# Patient Record
Sex: MALE | Race: WHITE | URBAN - METROPOLITAN AREA
[De-identification: names, ages, dates, MRNs, and addresses within clinical notes are randomized per-mention and may not be internally consistent; named-entity substitution may affect disease eponyms.]

---

## 2017-09-08 NOTE — PATIENT DISCUSSION
- Follow up in 1 week or sooner if she develops any ocular pain, diplopia, changes in vision, or motility issues

## 2017-09-08 NOTE — PATIENT DISCUSSION
New Prescription: doxycycline hyclate (doxycycline hyclate): capsule: 100 mg 1 capsule twice a day with meals by mouth 09-

## 2017-09-08 NOTE — PATIENT DISCUSSION
New Prescription: Keflex (cephalexin): capsule: 500 mg 1 capsule three times a day as directed by mouth 09-

## 2017-09-14 NOTE — PATIENT DISCUSSION
Continue: doxycycline hyclate (doxycycline hyclate): capsule: 100 mg 1 capsule twice a day with meals by mouth 09-

## 2017-09-14 NOTE — PATIENT DISCUSSION
New Prescription: neomycin-polymyxin-dexameth (neomycin-polymyxin-dexameth): ointment: 3.5-10,000-0.1 mg-unit/g-% 1 a small amount three times a day as directed into left eye 09-

## 2017-09-14 NOTE — PATIENT DISCUSSION
- Follow up in 1 month.  If no improvement in size of chalazion consider incision and drainage at that time

## 2017-10-16 NOTE — PATIENT DISCUSSION
Stopped Today: neomycin-polymyxin-dexameth (neomycin-polymyxin-dexameth): ointment: 3.5-10,000-0.1 mg-unit/g-% 1 a small amount three times a day as directed into left eye 09-

## 2017-10-16 NOTE — PATIENT DISCUSSION
- Continue to treat mild dry eye with artificial tears.  Patient will call if she changes her mind and would like a referral to Dr. Elise Reddy

## 2018-04-19 NOTE — PATIENT DISCUSSION
- Continue to treat mild dry eye with artificial tears.  Patient will call if she changes her mind and would like a referral to Dr. Lizeth Zayas

## 2018-05-04 NOTE — PATIENT DISCUSSION
- Refractive goal: Had a lengthy discussion with the patient about this. She does not mind wearing glasses and does not want a lens that isn't covered by insurance. She is unsure if she would rather have distance or near vision uncorrected, but is leaning toward aiming for distance. Will call her again a few days before surgery to confirm.

## 2018-05-04 NOTE — PATIENT DISCUSSION
- Discussed the risks, benefits, and alternatives of cataract surgery including but not limited to infection, bleeding, posterior capsular tear, need for additional surgery including secondary IOL or vitrectomy, the continued need to wear glasses at both distance and near, and permanent loss of vision. There is no guarantee that cataract surgery will improve the patient's vision or reduce glare/halos. The patient appears to understand and wishes to proceed with surgery, LEFT EYE FIRST (left eye dominant).

## 2018-06-21 NOTE — PATIENT DISCUSSION
Continue: prednisolone acetate (prednisolone acetate): drops,suspension: 1% 1 drop four times a day as directed into left eye 06-

## 2018-06-28 NOTE — PATIENT DISCUSSION
Continue: prednisolone acetate (prednisolone acetate): drops,suspension: 1% 1 drop three times a day as directed into left eye 06-

## 2018-07-19 NOTE — PATIENT DISCUSSION
- Advised patient to use artificial tears BID-TID for at least the next few days.  She may also use a drop of Pred forte daily for the next 3-4 days

## 2018-08-02 NOTE — PATIENT DISCUSSION
- Activity restrictions:  [place] shield use at night, no rubbing eye, no swimming, no excessive heavy lifting or bending over

## 2018-08-02 NOTE — PATIENT DISCUSSION
Continue: prednisolone acetate (prednisolone acetate): drops,suspension: 1% 1 drop four times a day as directed into right eye 07-

## 2018-08-10 NOTE — PATIENT DISCUSSION
Stopped Today: ofloxacin (ofloxacin): drops: 0.3% 1 drop four times a day as directed into right eye 07-

## 2018-08-31 NOTE — PATIENT DISCUSSION
New Prescription: olopatadine (olopatadine): drops: 0.2% 1 drop once a day as directed into both eyes 08-

## 2018-08-31 NOTE — PATIENT DISCUSSION
Continue: prednisolone acetate (prednisolone acetate): drops,suspension: 1% 1 drop four times a day as directed into right eye 08-

## 2018-08-31 NOTE — PATIENT DISCUSSION
- POM1: Va 20/25, IOP in the teens, clear cornea, looks great, vision limited somewhat by allergic conjunctivitis/tearing

## 2018-09-21 NOTE — PATIENT DISCUSSION
New Prescription: cromolyn (cromolyn): drops: 4% 1 drop three times a day as directed into both eyes 09-

## 2018-09-21 NOTE — PATIENT DISCUSSION
Stopped Today: olopatadine (olopatadine): drops: 0.2% 1 drop once a day as directed into both eyes 08-

## 2018-10-30 ENCOUNTER — IMPORTED ENCOUNTER (OUTPATIENT)
Dept: URBAN - METROPOLITAN AREA CLINIC 9 | Facility: CLINIC | Age: 73
End: 2018-10-30

## 2021-08-25 NOTE — PATIENT DISCUSSION
Garcia Visual Field 36 point screen: I have reviewed the visual fields both taped and untaped on this patient which demonstrate significant obstruction of the patient's peripheral visual field on both eyes.

## 2021-09-02 ENCOUNTER — IMPORTED ENCOUNTER (OUTPATIENT)
Dept: URBAN - METROPOLITAN AREA CLINIC 9 | Facility: CLINIC | Age: 76
End: 2021-09-02

## 2021-09-02 PROBLEM — E11.9: Noted: 2021-09-02

## 2021-09-02 PROBLEM — H01.024: Noted: 2021-09-02

## 2021-09-02 PROBLEM — H01.021: Noted: 2021-09-02

## 2021-09-02 PROBLEM — H11.153: Noted: 2021-09-02

## 2021-10-16 ASSESSMENT — VISUAL ACUITY
OS_CC: 20/25 - SN
OD_CC: 20/20 SN
OS_CC: 20/20 - SN
OD_CC: 20/25 -2 SN
OS_CC: 20/20 SN
OS_CC: 20/25 -2 SN
OS_CC: 20/25 - SN
OD_CC: 20/20 - SN
OD_CC: 20/20 -2 SN
OD_CC: 20/25 + SN

## 2021-10-16 ASSESSMENT — KERATOMETRY
OS_AXISANGLE2_DEGREES: 39
OD_K1POWER_DIOPTERS: 42.75
OS_AXISANGLE_DEGREES: 145
OD_AXISANGLE_DEGREES: 180
OD_K2POWER_DIOPTERS: 42.75
OS_K1POWER_DIOPTERS: 42.5
OS_AXISANGLE2_DEGREES: 55
OD_AXISANGLE2_DEGREES: 90
OD_AXISANGLE_DEGREES: 180
OD_K1POWER_DIOPTERS: 42.75
OS_K2POWER_DIOPTERS: 42.75
OS_K1POWER_DIOPTERS: 42.5
OS_K2POWER_DIOPTERS: 43.25
OD_AXISANGLE2_DEGREES: 90
OS_AXISANGLE_DEGREES: 129
OD_K2POWER_DIOPTERS: 42.75

## 2021-10-16 ASSESSMENT — TONOMETRY
OS_IOP_MMHG: 12
OS_IOP_MMHG: 15
OD_IOP_MMHG: 14
OD_IOP_MMHG: 12

## 2022-09-08 ENCOUNTER — ESTABLISHED PATIENT (OUTPATIENT)
Dept: URBAN - METROPOLITAN AREA CLINIC 4 | Facility: CLINIC | Age: 77
End: 2022-09-08

## 2022-09-08 DIAGNOSIS — H11.153: ICD-10-CM

## 2022-09-08 PROCEDURE — 92015 DETERMINE REFRACTIVE STATE: CPT

## 2022-09-08 PROCEDURE — 92014 COMPRE OPH EXAM EST PT 1/>: CPT

## 2022-09-08 ASSESSMENT — VISUAL ACUITY
OD_CC: 20/25+1
OS_CC: 20/50-1
OS_GLARE: 20/60-2
OD_GLARE: 20/40-2
OU_CC: 20/25+1

## 2022-09-08 ASSESSMENT — KERATOMETRY
OS_AXISANGLE2_DEGREES: 76
OD_K2POWER_DIOPTERS: 42.75
OS_K2POWER_DIOPTERS: 43.75
OD_K1POWER_DIOPTERS: 42.50
OS_K1POWER_DIOPTERS: 42.50
OS_AXISANGLE_DEGREES: 166
OD_AXISANGLE2_DEGREES: 84
OD_AXISANGLE_DEGREES: 174

## 2022-09-08 ASSESSMENT — TONOMETRY
OS_IOP_MMHG: 14
OD_IOP_MMHG: 12

## 2024-04-08 NOTE — PATIENT DISCUSSION
Lens Material:Polycarbonate [FreeTextEntry1] : here for followup. Requested visit for chest pain mar 5th.  Coronary CT angio test reviewed:  "IMPRESSION: Normal coronary arteries." "CALCIUM SCORE: The calculated Agatston score is 0."  Echo Nov '23 w/ [facility] reviewed:  EF 55-60% normal diast. function mild GA  Since last visit no changes in symptoms.